# Patient Record
Sex: FEMALE | Race: ASIAN | URBAN - METROPOLITAN AREA
[De-identification: names, ages, dates, MRNs, and addresses within clinical notes are randomized per-mention and may not be internally consistent; named-entity substitution may affect disease eponyms.]

---

## 2021-09-08 ENCOUNTER — OFFICE VISIT (OUTPATIENT)
Dept: URBAN - METROPOLITAN AREA CLINIC 51 | Facility: CLINIC | Age: 61
End: 2021-09-08
Payer: COMMERCIAL

## 2021-09-08 DIAGNOSIS — H25.13 AGE-RELATED NUCLEAR CATARACT, BILATERAL: ICD-10-CM

## 2021-09-08 DIAGNOSIS — H02.422 MYOGENIC PTOSIS OF LEFT EYELID: ICD-10-CM

## 2021-09-08 DIAGNOSIS — H53.2 DIPLOPIA: Primary | ICD-10-CM

## 2021-09-08 DIAGNOSIS — H16.143 PUNCTATE KERATITIS, BILATERAL: ICD-10-CM

## 2021-09-08 DIAGNOSIS — H35.413 LATTICE DEGENERATION OF RETINA, BILATERAL: ICD-10-CM

## 2021-09-08 PROCEDURE — 92134 CPTRZ OPH DX IMG PST SGM RTA: CPT | Performed by: OPTOMETRIST

## 2021-09-08 PROCEDURE — 99204 OFFICE O/P NEW MOD 45 MIN: CPT | Performed by: OPTOMETRIST

## 2021-09-08 ASSESSMENT — KERATOMETRY
OD: 43.75
OS: 43.73

## 2021-09-08 ASSESSMENT — INTRAOCULAR PRESSURE
OS: 12
OD: 11

## 2021-09-08 ASSESSMENT — VISUAL ACUITY
OS: 20/20
OD: 20/20

## 2021-09-08 NOTE — IMPRESSION/PLAN
Impression: Punctate keratitis, bilateral: H16.143. Plan: Recommend use:
Lipid based Artificial tears QID OU 
gel/ointment QHS Warm compresses BID Eyelid scrubs/cleansers 2-3 times per week
> 600 mg Omega 3 per day Hydrate / Add humidifier / Avoid direct air flow / Blink fully

## 2021-09-08 NOTE — IMPRESSION/PLAN
Impression: Myogenic ptosis of left eyelid: H02.422.  Plan: occurs when tired 
recommend eval with oculoplastics and r/o Myasthenia gravis , thryoid disorder, medication side effect (trazodone)

## 2021-09-08 NOTE — IMPRESSION/PLAN
Impression: Diplopia: H53.2. Plan: left EXO long standing per pt, tried Molina deisy - unreliable No treatment at this time.